# Patient Record
Sex: MALE | Race: OTHER | Employment: FULL TIME | URBAN - METROPOLITAN AREA
[De-identification: names, ages, dates, MRNs, and addresses within clinical notes are randomized per-mention and may not be internally consistent; named-entity substitution may affect disease eponyms.]

---

## 2018-08-07 ENCOUNTER — HOSPITAL ENCOUNTER (OUTPATIENT)
Dept: PHYSICAL THERAPY | Age: 53
Discharge: HOME | End: 2018-08-07
Payer: COMMERCIAL

## 2018-08-07 PROCEDURE — 97161 PT EVAL LOW COMPLEX 20 MIN: CPT

## 2018-08-07 PROCEDURE — 97140 MANUAL THERAPY 1/> REGIONS: CPT

## 2018-08-07 NOTE — PROGRESS NOTES
PHYSICAL THERAPY - DAILY TREATMENT NOTE    Patient Name: Faustino Mir        Date: 2018  : 1965   YES Patient  Verified  Visit #:     Insurance: Payor: Shawanda Berger / Plan: [place] [employer] P[place] / Product Type: PPO /      In time: 11:15 A Out time: 12 P   Total Treatment Time: 45     Medicare Time Tracking (below)   Total Timed Codes (min):  NA 1:1 Treatment Time:  NA     TREATMENT AREA =  Left shoulder pain [M25.512]    SUBJECTIVE  Pain Level (on 0 to 10 scale):  3  / 10   Medication Changes/New allergies or changes in medical history, any new surgeries or procedures?     NO    If yes, update Summary List   Subjective Functional Status/Changes:  []  No changes reported     See POC          OBJECTIVE  Modalities Rationale:    PD   min [] Estim, type/location:                                      []  att     []  unatt     []  w/US     []  w/ice    []  w/heat    min []  Mechanical Traction: type/lbs                   []  pro   []  sup   []  int   []  cont    []  before manual    []  after manual    min []  Ultrasound, settings/location:      min []  Iontophoresis w/ dexamethasone, location:                                               []  take home patch       []  in clinic    min []  Ice     []  Heat    location/position:     min []  Vasopneumatic Device, press/temp:     min []  Other:    [] Skin assessment post-treatment (if applicable):    []  intact    []  redness- no adverse reaction     []redness  adverse reaction:        5  NB min Therapeutic Exercise:  [x]  See flow sheet   Rationale:      increase ROM to improve the patients ability to return to overhead reaching     10 min Manual Therapy: Gentle manual stretch for [email or web address] deg of ABD & scaption in supine   Rationale:      decrease pain and increase ROM to improve patient's ability to return to indep reaching      min Therapeutic Activity:    Rationale:      min Neuromuscular Re-ed:    Rationale:        min Gait Training:    Rationale:       min Patient Education:  YES  Reviewed HEP   []  Progressed/Changed HEP based on: Other Objective/Functional Measures:    See POC     Post Treatment Pain Level (on 0 to 10) scale:   3  / 10     ASSESSMENT  Assessment/Changes in Function:   See POC     []  See Progress Note/Recertification   Patient will continue to benefit from skilled PT services to modify and progress therapeutic interventions, address functional mobility deficits, address ROM deficits, address strength deficits, assess and modify postural abnormalities and instruct in home and community integration to attain remaining goals.    Progress toward goals / Updated goals:    Progressing towards goals established at [address] [facility] [address]  [x]  Upgrade activities as tolerated YES Continue plan of care   []  Discharge due to :    []  Other:      Therapist: Giselle Jerome PT    Date: 8/7/2018 Time: 1:51 PM     Future Appointments  Date Time Provider [place]   8/10/2018 1:00 PM Giselle Jerome PT [facility] [place]   [facility]   8/17/2018 9:30 AM Giselle Jerome PT [facility]   8/20/2018 5:00 PM [facility]   8/24/2018 9:30 AM Giselle Jerome PT [facility]   8/27/2018 5:00 PM [facility]   8/31/2018 9:30 AM [facility]

## 2018-08-07 NOTE — PROGRESS NOTES
[address]  [facility]  [facility]  [address], [address] ,[address], [address]  Phone: [phone]  Fax: [phone] / [address] [facility]  Patient Name: Rajesh Powers : 1965   Medical   Diagnosis: Left shoulder pain [M25.512] Treatment Diagnosis: L shoulder pain   Onset Date: 5-6 mos prior to eval     Referral Source: Maxx Orr MD Start of Care [facility]): 2018   Prior Hospitalization: See medical history Provider #: [id number]   Prior Level of Function: Pain-free unlimited use of L shoulder with ADLs & work   Comorbidities: None   Medications: Verified on Patient Summary List   The Plan of Care and following information is based on the information from the initial evaluation.   ==================================================================================  Assessment / key information:  Patient is a 50 y.o. male who presents to [facility] with Dx of L shoulder pain/adhesive capsulitis. Patient reports initial onset of sx on  which were of unknown etiology. Patient reports sx are fairly constant in nature with worsening of sx with use of L shoulder such as overhead mobility, laying on L side at night, reaching behind his back, and generalized overuse of L shoulder with ADLs and work activities. Sx improve with rest, use of OTC ibuprofen prn & since most recent injection on 18. Average reported pain level at 6/10, 7/10 at worst & 4/10 at best. Upon objective evaluation patient demonstrates flex/scaption: 125 deg, ABD 92 deg, ER@ 65: 45, IR at fxl level of distal glut. Post & inferior capsule limited by tightness today, good tolerance to manual therapy & instruction in initial HEP. Overall L shoulder strength at 4/5 with mild c/o pain upon MMT throughout.  Patient can benefit from PT interventions to improve ROM & capsular mobility, decrease pain & improve strength to facilitate ADLs & overall functional status.   ==================================================================================  Eval Complexity: History LOW Complexity : Zero comorbidities / personal factors that will impact the outcome / POC;  Examination  MEDIUM Complexity : 3 Standardized tests and measures addressing body structure, function, activity limitation and / or participation in recreation ; Presentation LOW Complexity : Stable, uncomplicated ;  Decision Making MEDIUM Complexity : FOTO score of 26-74; Overall Complexity LOW   Problem List: pain affecting function, decrease ROM, decrease strength, impaired gait/ balance, decrease ADL/ functional abilitiies, decrease activity tolerance, decrease flexibility/ joint mobility and decrease transfer abilities   Treatment Plan may include any combination of the following: Therapeutic exercise, Therapeutic activities, Neuromuscular re-education, Physical agent/modality, Manual therapy, Aquatic therapy, Patient education, Self Care training, Functional mobility training and Home safety training  Patient / Family readiness to learn indicated by: asking questions, trying to perform skills and interest  Persons(s) to be included in education: patient (P)  Barriers to Learning/Limitations: None  Measures taken:    Patient Goal (s): \"ROM, no more pain, stronger shoulder\"   Patient self reported health status: fair  Rehabilitation Potential: good   Short Term Goals: To be accomplished in  2  weeks:  1) Establish HEP to prevent further disability. 2) Patient will report decreased c/o pain to < or = 3-4/10 to facilitate overhead reaching with manageable sx in L shoulder. 3) Improve FOTO score from 53 points to > or = 63 points indicating improved tolerance with ADLs in regards to L shoulder.  Long Term Goals:  To be accomplished in  4  weeks:  1) Improve FOTO score from 63 points to > or = 72 points indicating improved tolerance with ADLs in regards to L shoulder. 2) Improve L shoulder AROM for all planes of motion to [facility] so ROM is available for dressing activities and/or overhead reaching. 3) Improve overall strength of L shoulder to 5-/5 so strength is available for return to light lifting activities at work/home. 4) Patient will report decreased c/o pain to < or = 1-2/10 to facilitate sleeping at night uninterrupted with manageable sx in L shoulder. Frequency / Duration:   Patient to be seen  2-3  times per week for 4  weeks:  Patient / Caregiver education and instruction: self care, activity modification, brace/ splint application and exercises  G-Codes (GP): NA  Therapist Signature: Odella Apley, MPT, cert MDT Date: 3/8/8039   Certification Period: NA Time: 11:22 AM   ===========================================================================================  I certify that the above Physical Therapy Services are being furnished while the patient is under my care. I agree with the treatment plan and certify that this therapy is necessary. Physician Signature:        Date:       Time:     Please sign and return to [facility] [place] or you may fax the signed copy to [phone]. Thank you.

## 2018-08-10 ENCOUNTER — HOSPITAL ENCOUNTER (OUTPATIENT)
Dept: PHYSICAL THERAPY | Age: 53
Discharge: HOME | End: 2018-08-10
Payer: COMMERCIAL

## 2018-08-10 PROCEDURE — 97140 MANUAL THERAPY 1/> REGIONS: CPT

## 2018-08-10 PROCEDURE — 97110 THERAPEUTIC EXERCISES: CPT

## 2018-08-10 NOTE — PROGRESS NOTES
PHYSICAL THERAPY - DAILY TREATMENT NOTE    Patient Name: Los Bradshaw        Date: 8/10/2018  : 1965   YES Patient  Verified  Visit #:   2   of   12  Insurance: Payor: Anuradha Weir / Plan: [place] [employer] PPO / Product Type: PPO /      In time: 1:10 P Out time: 1:50 P   Total Treatment Time: 45     Medicare Time Tracking (below)   Total Timed Codes (min):  NA 1:1 Treatment Time:  NA     TREATMENT AREA =  Left shoulder pain [M25.512]    SUBJECTIVE  Pain Level (on 0 to 10 scale):  3  / 10   Medication Changes/New allergies or changes in medical history, any new surgeries or procedures? NO    If yes, update Summary List   Subjective Functional Status/Changes:  []  No changes reported     Patient reports that he is doing stretches at home & icing & he feels like he move his arm more.            OBJECTIVE  Modalities Rationale:     decrease pain to improve patient's ability to return to pain-free use of L shoulder with ADLs   min [] Estim, type/location:                                      []  att     []  unatt     []  w/US     []  w/ice    []  w/heat    min []  Mechanical Traction: type/lbs                   []  pro   []  sup   []  int   []  cont    []  before manual    []  after manual    min []  Ultrasound, settings/location:      min []  Iontophoresis w/ dexamethasone, location:                                               []  take home patch       []  in clinic   5 min [x]  Ice     []  Heat    location/position: Supine to L shoulder     min []  Vasopneumatic Device, press/temp:     min []  Other:    [] Skin assessment post-treatment (if applicable):    []  intact    []  redness- no adverse reaction     []redness  adverse reaction:        25 min Therapeutic Exercise:  [x]  See flow sheet   Rationale:      increase ROM and increase strength to improve the patients ability to return to indep dressing     15 min Manual Therapy: Gentle manual stretch for flex/scaption, ER in supine, post capsule stretch in supine Rationale:      increase ROM to improve patient's ability to return to pain-free use of L shoulder with work     min Therapeutic Activity:    Rationale:      min Neuromuscular Re-ed:    Rationale:        min Gait Training:    Rationale:       min Patient Education:  Meagan Del Valle   []  Progressed/Changed HEP based on: Other Objective/Functional Measures: Added TE per flow sheet     Post Treatment Pain Level (on 0 to 10) scale:   0  / 10     ASSESSMENT  Assessment/Changes in Function:   Good tolerance to initial program today & MT     []  See Progress Note/Recertification   Patient will continue to benefit from skilled PT services to modify and progress therapeutic interventions, address functional mobility deficits, address ROM deficits, address strength deficits, assess and modify postural abnormalities and instruct in home and community integration to attain remaining goals.    Progress toward goals / Updated goals:    Progressing towards goal established at [address]  [x]  Upgrade activities as tolerated YES Continue plan of care   []  Discharge due to :    []  Other:      Therapist: Wendy Lopez, PT    Date: 8/10/2018 Time: 3:49 PM     Future Appointments  Date Time Provider [place] [place]   8/13/2018 3:00 PM [facility]   8/17/2018 9:30 AM Wendy Patch, PT [facility]   8/20/2018 5:00 PM [facility]   8/24/2018 9:30 AM Wendy Patch, PT [facility]   8/27/2018 5:00 PM [facility]   [facility]

## 2018-08-13 ENCOUNTER — HOSPITAL ENCOUNTER (OUTPATIENT)
Dept: PHYSICAL THERAPY | Age: 53
Discharge: HOME | End: 2018-08-13
Payer: COMMERCIAL

## 2018-08-13 PROCEDURE — 97110 THERAPEUTIC EXERCISES: CPT

## 2018-08-13 PROCEDURE — 97140 MANUAL THERAPY 1/> REGIONS: CPT

## 2018-08-13 NOTE — PROGRESS NOTES
PHYSICAL THERAPY - DAILY TREATMENT NOTE    Patient Name: Darinel Garcia        Date: 2018  : 1965   yes Patient  Verified  Visit #:   3   of   12  Insurance: Payor: Rody Briones / Plan: Diego Miller PPO / Product Type: PPO /      In time: 3:00 Out time: 4:07   Total Treatment Time: 67     Medicare/BCBS Time Tracking (below)   Total Timed Codes (min):  NA 1:1 Treatment Time:  NA     TREATMENT AREA =  Left shoulder pain [M25.512]    SUBJECTIVE  Pain Level (on 0 to 10 scale):  0  / 10   Medication Changes/New allergies or changes in medical history, any new surgeries or procedures?    no  If yes, update Summary List   Subjective Functional Status/Changes:  []  No changes reported     Patient reports finally getting a full night of sleep, however continues to avoid sleeping on L shoulder.  States that he was showing off reaching OH at work prior to today's PT session; denies sharp pain during activities       OBJECTIVE  Modalities Rationale:     decrease pain to improve patient's ability to return to pain-free functional ADLs   min [] Estim, type/location:                                      []  att     []  unatt     []  w/US     []  w/ice    []  w/heat    min []  Mechanical Traction: type/lbs                   []  pro   []  sup   []  int   []  cont    []  before manual    []  after manual    min []  Ultrasound, settings/location:      min []  Iontophoresis w/ dexamethasone, location:                                               []  take home patch       []  in clinic   10 min [x]  Ice     []  Heat    location/position: Supine with wedge to L shoulder post-session    min []  Vasopneumatic Device, press/temp:     min []  Other:    [] Skin assessment post-treatment (if applicable):    []  intact    []  redness- no adverse reaction     []redness  adverse reaction:        47 min Therapeutic Exercise:  [x]  See flow sheet   Rationale:      increase ROM and increase strength to improve the patients ability to return to independent dressing. 10 min Manual Therapy: Gentle L shoulder manual stretch for flexion/scaption, ER (at 45 deg abd), and post capsule stretch in supine   Rationale:      increase ROM to improve patient's ability to perform pain-free work activities     min Therapeutic Activity:    Rationale:        min Neuromuscular Re-ed:    Rationale:       min Gait Training:    Rationale:         min Patient Education:  yes  Reviewed HEP   []  Progressed/Changed HEP based on: Other Objective/Functional Measures:    Presented with tightness and tenderness in post capsule during ER and post cap stretch; denies sharp pain during MT    Added sleeper stretch and S/L abd to 90 deg     Post Treatment Pain Level (on 0 to 10) scale:   0  / 10     ASSESSMENT  Assessment/Changes in Function:     Demonstrated good tolerance with progression of exercises; noted \"pressure\" during sleeper stretch and ms fatigue during S/L abd to 90, however denies sharp pain or red flags. []  See Progress Note/Recertification   Patient will continue to benefit from skilled PT services to modify and progress therapeutic interventions, address functional mobility deficits, address ROM deficits, address strength deficits, analyze and address soft tissue restrictions, analyze and cue movement patterns, analyze and modify body mechanics/ergonomics and assess and modify postural abnormalities to attain remaining goals.    Progress toward goals / Updated goals:    Good progress towards STG #1 and #2     PLAN  []  Upgrade activities as tolerated yes Continue plan of care   []  Discharge due to :    []  Other:      Therapist: LUZ Maciel    Date: 8/13/2018 Time: 4:17 PM     Future Appointments  Date Time Provider [place] [place]   8/13/2018 3:00 PM Ayla [facility]   8/17/2018 9:30 AM Jose Daniel Blanton, PT [facility]   8/20/2018 5:00 PM Ayla [facility]   8/24/2018 9:30 AM Chavez Donald, PT [facility] 8/27/2018 5:00 PM Briseyda Ornelas [facility]   8/31/2018 9:30 AM Briseyda Ornelas [facility]

## 2018-08-17 ENCOUNTER — HOSPITAL ENCOUNTER (OUTPATIENT)
Dept: PHYSICAL THERAPY | Age: 53
Discharge: HOME | End: 2018-08-17
Payer: COMMERCIAL

## 2018-08-17 PROCEDURE — 97140 MANUAL THERAPY 1/> REGIONS: CPT

## 2018-08-17 PROCEDURE — 97110 THERAPEUTIC EXERCISES: CPT

## 2018-08-17 NOTE — PROGRESS NOTES
PHYSICAL THERAPY - DAILY TREATMENT NOTE    Patient Name: Donnie Troy        Date: 2018  : 1965   YES Patient  Verified  Visit #:   4   of   12  Insurance: Payor: Angel Mckeon / Plan: [place] [employer] PPO / Product Type: PPO /      In time: 9:25 A Out time: 10:30 A   Total Treatment Time: 55/  40     Medicare Time Tracking (below)   Total Timed Codes (min):  NA 1:1 Treatment Time:  NA     TREATMENT AREA =  Left shoulder pain [M25.512]    SUBJECTIVE  Pain Level (on 0 to 10 scale):  0  / 10   Medication Changes/New allergies or changes in medical history, any new surgeries or procedures? NO    If yes, update Summary List   Subjective Functional Status/Changes:  []  No changes reported     Patient reports that he will try to play golf next week, he has not played golf in over a month.            OBJECTIVE  Modalities Rationale:     decrease pain to improve patient's ability to return to pain-free standing   min [] Estim, type/location:                                      []  att     []  unatt     []  w/US     []  w/ice    []  w/heat    min []  Mechanical Traction: type/lbs                   []  pro   []  sup   []  int   []  cont    []  before manual    []  after manual    min []  Ultrasound, settings/location:      min []  Iontophoresis w/ dexamethasone, location:                                               []  take home patch       []  in clinic   10 min [x]  Ice     []  Heat    location/position: Supine to L shoulder    min []  Vasopneumatic Device, press/temp:     min []  Other:    [] Skin assessment post-treatment (if applicable):    []  intact    []  redness- no adverse reaction     []redness  adverse reaction:        35/  30 min Therapeutic Exercise:  [x]  See flow sheet   Rationale:      increase ROM and increase strength to improve the patients ability to return to pain-free use of L shoulder with golf     10 min Manual Therapy: Gentle manual stretch for flexion, scaption, [email or web address] in supine Rationale:      decrease pain and increase ROM to improve patient's ability to return to indep dressing     min Therapeutic Activity:    Rationale:      min Neuromuscular Re-ed:    Rationale:        min Gait Training:    Rationale:       min Patient Education:  YES  Reviewed HEP   []  Progressed/Changed HEP based on: Other Objective/Functional Measures:    PROM: [email or web address]: 60 deg in supine     Post Treatment Pain Level (on 0 to 10) scale:   4  / 10     ASSESSMENT  Assessment/Changes in Function:   Slight increase in pain after capsular stretches today      []  See Progress Note/Recertification   Patient will continue to benefit from skilled PT services to modify and progress therapeutic interventions, address functional mobility deficits, address ROM deficits, address strength deficits, assess and modify postural abnormalities and instruct in home and community integration to attain remaining goals.    Progress toward goals / Updated goals:    Progressing towards STG 2, 3     PLAN  [x]  Upgrade activities as tolerated YES Continue plan of care   []  Discharge due to :    []  Other:      Therapist: Oralia Swenson PT    Date: 8/17/2018 Time: 10:06 AM     Future Appointments  Date Time Provider [place]   8/20/2018 5:00 PM [facility] [place] [place]   8/24/2018 9:30 AM Oralia Swenson [place] [facility] [place] [facility] [place]   8/27/2018 5:00 PM [facility]   8/31/2018 9:30 AM [facility]

## 2018-08-20 ENCOUNTER — HOSPITAL ENCOUNTER (OUTPATIENT)
Dept: PHYSICAL THERAPY | Age: 53
Discharge: HOME | End: 2018-08-20
Payer: COMMERCIAL

## 2018-08-20 PROCEDURE — 97110 THERAPEUTIC EXERCISES: CPT

## 2018-08-20 PROCEDURE — 97140 MANUAL THERAPY 1/> REGIONS: CPT

## 2018-08-20 NOTE — PROGRESS NOTES
PHYSICAL THERAPY - DAILY TREATMENT NOTE    Patient Name: Mary Jane Theodore        Date: 2018  : 1965   yes Patient  Verified  Visit #:      of   12  Insurance: Payor: Tracie Mesa / Plan: [address] PPO / Product Type: PPO /      In time: 4:55 Out time: 6:06   Total Treatment Time: 71     Medicare/[facility] Time Tracking (below)   Total Timed Codes (min):  NA 1:1 Treatment Time:  NA     TREATMENT AREA =  Left shoulder pain [M25.512]    SUBJECTIVE  Pain Level (on 0 to 10 scale):  0  / 10   Medication Changes/New allergies or changes in medical history, any new surgeries or procedures?    no  If yes, update Summary List   Subjective Functional Status/Changes:  []  No changes reported     Patient reports going to a party over the weekend, staying up late on Saturday night, and his back started bothering him. States that he was throwing/catching a baseball prior to today's PT session and noted superior to posterior L shoulder pain when trying to catch an [place] ball.  Reports he was unable to go golfing this morning to check his tolerance       OBJECTIVE  Modalities Rationale:     decrease pain to improve patient's ability to return to pain-free functional ADLs   min [] Estim, type/location:                                      []  att     []  unatt     []  w/US     []  w/ice    []  w/heat    min []  Mechanical Traction: type/lbs                   []  pro   []  sup   []  int   []  cont    []  before manual    []  after manual    min []  Ultrasound, settings/location:      min []  Iontophoresis w/ dexamethasone, location:                                               []  take home patch       []  in clinic   10 min [x]  Ice     []  Heat    location/position: Supine with wedge to L shoulder post-session    min []  Vasopneumatic Device, press/temp:     min []  Other:    [] Skin assessment post-treatment (if applicable):    []  intact    []  redness- no adverse reaction     []redness  adverse reaction:        41/51 min Therapeutic Exercise:  [x]  See flow sheet   Rationale:      increase ROM and increase strength to improve the patients ability to return to independent dressing. 10 min Manual Therapy: Gentle L shoulder manual stretch for flexion/scaption, ER/IR (at 45 deg abd)   Rationale:      increase ROM to improve patient's ability to perform pain-free work activities     min Therapeutic Activity:    Rationale:        min Neuromuscular Re-ed:    Rationale:       min Gait Training:    Rationale:         min Patient Education:  yes  Reviewed HEP   [x]  Progressed/Changed HEP based on: Added sleeper stretch     Other Objective/Functional Measures:    TE per flowsheet    ERP/discomfort in L post cap with PROM flexion/scaption >110 deg and ER >60 deg    Added finger ladder scaption   Post Treatment Pain Level (on 0 to 10) scale:   0  / 10     ASSESSMENT  Assessment/Changes in Function:     Noted discomfort/pain with dowel ER and sleeper stretch in L post cap; decrease c/o discomfort/pain with rest and following ice     []  See Progress Note/Recertification   Patient will continue to benefit from skilled PT services to modify and progress therapeutic interventions, address functional mobility deficits, address ROM deficits, address strength deficits, analyze and address soft tissue restrictions, analyze and cue movement patterns, analyze and modify body mechanics/ergonomics and assess and modify postural abnormalities to attain remaining goals.    Progress toward goals / Updated goals:    Slow progress towards STG #2  Will continue to progress therex per patient's tolerance       PLAN  []  Upgrade activities as tolerated yes Continue plan of care   []  Discharge due to :    []  Other:      Therapist: LUZ Allison    Date: 8/20/2018 Time: 6:31 PM     Future Appointments  Date Time Provider [place]   8/20/2018 5:00 PM Corrina Cantu [facility]   8/24/2018 9:30 AM Sebastian Shannon PT [facility]   8/27/2018 5:00 PM [facility]ie Fr[facility] [address]   [facility]2018 [facility] [address]

## 2018-08-24 ENCOUNTER — HOSPITAL ENCOUNTER (OUTPATIENT)
Dept: PHYSICAL THERAPY | Age: 53
Discharge: HOME | End: 2018-08-24
Payer: COMMERCIAL

## 2018-08-24 PROCEDURE — 97140 MANUAL THERAPY 1/> REGIONS: CPT

## 2018-08-24 PROCEDURE — 97110 THERAPEUTIC EXERCISES: CPT

## 2018-08-24 NOTE — PROGRESS NOTES
PHYSICAL THERAPY - DAILY TREATMENT NOTE    Patient Name: Amelia Aly        Date: 2018  : 1965   yes Patient  Verified  Visit #:      12  Insurance: Payor: Selin Lamb / Plan: Starla Hemphill PPO / Product Type: PPO /      In time: 9:26 AM Out time: 10:35 AM   Total Treatment Time: 69     Medicare/BCBS Time Tracking (below)   Total Timed Codes (min):  NA 1:1 Treatment Time:  NA     TREATMENT AREA =  Left shoulder pain [M25.512]    SUBJECTIVE  Pain Level (on 0 to 10 scale):  0  / 10   Medication Changes/New allergies or changes in medical history, any new surgeries or procedures?    no  If yes, update Summary List   Subjective Functional Status/Changes:  []  No changes reported     \"I haven't tried golfing yet, but I am on the . I've been up since 330 AM today so I'm a little tired, the shoulder is good though. It's just a little tight in the front and some in the back. \"       OBJECTIVE  Modalities Rationale:     decrease pain to improve patient's ability to return to pain-free functional ADLs   min [] Estim, type/location:                                      []  att     []  unatt     []  w/US     []  w/ice    []  w/heat    min []  Mechanical Traction: type/lbs                   []  pro   []  sup   []  int   []  cont    []  before manual    []  after manual    min []  Ultrasound, settings/location:      min []  Iontophoresis w/ dexamethasone, location:                                               []  take home patch       []  in clinic   10 min [x]  Ice     []  Heat    location/position: Supine with wedge to L shoulder post-session    min []  Vasopneumatic Device, press/temp:     min []  Other:    [] Skin assessment post-treatment (if applicable):    []  intact    []  redness- no adverse reaction     []redness  adverse reaction:        44 min Therapeutic Exercise:  [x]  See flow sheet   Rationale:      increase ROM and increase strength to improve the patients ability to return to independent dressing. 15 min Manual Therapy: Gentle L shoulder manual stretch for flexion/scaption, ER/IR (at 45 deg abd); contract/relax ER/IR; STM to anterior delt   Rationale:      increase ROM to improve patient's ability to perform pain-free work activities     min Therapeutic Activity:    Rationale:        min Neuromuscular Re-ed:    Rationale:       min Gait Training:    Rationale:         min Patient Education:  yes  Reviewed HEP   []  Progressed/Changed HEP based on: Other Objective/Functional Measures:    TE per flowsheet    L shoulder AROM: flex 145 deg, abd 125 deg, funct ER C7, and funct IR L5; noted end range discomfort with all movements    Added corner stretch and IR with towel   Post Treatment Pain Level (on 0 to 10) scale:   0  / 10     ASSESSMENT  Assessment/Changes in Function:     Noted L anterior shoulder discomfort during new stretches, however good relief of initial c/o tightness following stretch. []  See Progress Note/Recertification   Patient will continue to benefit from skilled PT services to modify and progress therapeutic interventions, address functional mobility deficits, address ROM deficits, address strength deficits, analyze and address soft tissue restrictions, analyze and cue movement patterns, analyze and modify body mechanics/ergonomics and assess and modify postural abnormalities to attain remaining goals.    Progress toward goals / Updated goals:    Progressing towards LTG #2  Will continue to progress therex per patient's tolerance       PLAN  []  Upgrade activities as tolerated yes Continue plan of care   []  Discharge due to :    []  Other:      Therapist: LUZ Jensen    Date: 8/24/2018 Time: 12:54 PM     Future Appointments  Date Time Provider Dylon Madison   8/24/2018 9:30 AM Mayra Bertrand [facility]   8/27/2018 5:00 PM Mayra Bertrand [facility]   8/31/2018 9:30 AM Mayra Bertrand [facility]

## 2018-08-27 ENCOUNTER — HOSPITAL ENCOUNTER (OUTPATIENT)
Dept: PHYSICAL THERAPY | Age: 53
Discharge: HOME | End: 2018-08-27
Payer: COMMERCIAL

## 2018-08-27 PROCEDURE — 97140 MANUAL THERAPY 1/> REGIONS: CPT

## 2018-08-27 PROCEDURE — 97110 THERAPEUTIC EXERCISES: CPT

## 2018-08-27 NOTE — PROGRESS NOTES
PHYSICAL THERAPY - DAILY TREATMENT NOTE    Patient Name: Adriana Norman        Date: 2018  : 1965   yes Patient  Verified  Visit #:     Insurance: Payor: Kaylah Valencia / Plan: Airam Valentin PPO / Product Type: PPO /      In time: 5:00 Out time: 6:09   Total Treatment Time: 69     Medicare/BCBS Time Tracking (below)   Total Timed Codes (min):  NA 1:1 Treatment Time:  NA     TREATMENT AREA =  Left shoulder pain [M25.512]    SUBJECTIVE  Pain Level (on 0 to 10 scale):  3  / 10   Medication Changes/New allergies or changes in medical history, any new surgeries or procedures?    no  If yes, update Summary List   Subjective Functional Status/Changes:  []  No changes reported     \"I was carrying a 5 gallon bucket filled with baseballs with my L arm. It really hurts on the L side near my shoulder blade.  I haven't gone golfing yet, but I will be golfing on Thursday for my birthday\"       OBJECTIVE  Modalities Rationale:     decrease pain to improve patient's ability to return to pain-free functional ADLs   min [] Estim, type/location:                                      []  att     []  unatt     []  w/US     []  w/ice    []  w/heat    min []  Mechanical Traction: type/lbs                   []  pro   []  sup   []  int   []  cont    []  before manual    []  after manual    min []  Ultrasound, settings/location:      min []  Iontophoresis w/ dexamethasone, location:                                               []  take home patch       []  in clinic   5NB min [x]  Ice     []  Heat    location/position: Supine with wedge to L shoulder post-session    min []  Vasopneumatic Device, press/temp:     min []  Other:    [] Skin assessment post-treatment (if applicable):    []  intact    []  redness- no adverse reaction     []redness  adverse reaction:        49 min Therapeutic Exercise:  [x]  See flow sheet   Rationale:      increase ROM and increase strength to improve the patients ability to return to independent dressing. 15 min Manual Therapy: Gentle L shoulder manual stretch for flexion/scaption, ER/IR (at 45 deg abd); contract/relax ER/IR; STM/DTM to infraspinatus and teres minor   Rationale:      increase ROM to improve patient's ability to perform pain-free work activities     min Therapeutic Activity:    Rationale:        min Neuromuscular Re-ed:    Rationale:       min Gait Training:    Rationale:         min Patient Education:  yes  Reviewed HEP   []  Progressed/Changed HEP based on: Other Objective/Functional Measures:    R AROM IR: T7  L AROM IR: L5   L AAROM IR: L1    Demonstrates full, end-range discomfort active flexion, abd, funct ER; continues to demonstrate ROM restriction with IR with end-range discomfort    Added S/L ER to neutral  Added weight to S/L abd to 90 deg     Post Treatment Pain Level (on 0 to 10) scale:   0  / 10     ASSESSMENT  Assessment/Changes in Function:     Demonstrated improved L AROM IR following IR stretch with towel, however noted end range discomfort. Demonstrated good tolerance with progression of strengthening exercises; denies sharp pain or red flags during exercises. Noted good pain relief following stretches and utilization of ice to L shoulder. Discussed and educated patient on performing stretches before and after recreational activities to decrease c/o soreness and pain in L shoulder/shoulder blade area. Patient responded with good understanding. []  See Progress Note/Recertification   Patient will continue to benefit from skilled PT services to modify and progress therapeutic interventions, address functional mobility deficits, address ROM deficits, address strength deficits, analyze and address soft tissue restrictions, analyze and cue movement patterns, analyze and modify body mechanics/ergonomics and assess and modify postural abnormalities to attain remaining goals.    Progress toward goals / Updated goals:    Good progress towards ROM and strengthening goals  Will continue to progress therex per patient's tolerance        PLAN  []  Upgrade activities as tolerated yes Continue plan of care   []  Discharge due to :    []  Other:      Therapist: LUZ Chery    Date: 8/27/2018 Time: 6:15 PM     Future Appointments  Date Time Provider Dylon Madison   8/27/2018 5:00 PM Jairo Crocker [facility]   8/31/2018 9:30 AM Jairo Crocker [facility]

## 2018-08-31 ENCOUNTER — HOSPITAL ENCOUNTER (OUTPATIENT)
Dept: PHYSICAL THERAPY | Age: 53
Discharge: HOME | End: 2018-08-31
Payer: COMMERCIAL

## 2018-08-31 PROCEDURE — 97110 THERAPEUTIC EXERCISES: CPT

## 2018-08-31 PROCEDURE — 97140 MANUAL THERAPY 1/> REGIONS: CPT

## 2018-08-31 NOTE — PROGRESS NOTES
PHYSICAL THERAPY - DAILY TREATMENT NOTE Patient Name: Wally Rahman        Date: 2018 : 1965   yes Patient  Verified Visit #:     Insurance: Payor: Enid Perea / Plan: [employer] PPO / Product Type: PPO / In time: 9:35 AM Out time: 10:46 AM  
Total Treatment Time: 70 Medicare/BCBS Time Tracking (below) Total Timed Codes (min):  NA 1:1 Treatment Time:  NA  
 
TREATMENT AREA =  Left shoulder pain [M25.512] SUBJECTIVE Pain Level (on 0 to 10 scale):  0  / 10 Medication Changes/New allergies or changes in medical history, any new surgeries or procedures?    no  If yes, update Summary List  
Subjective Functional Status/Changes:  []  No changes reported Patient reports hitting 2 buckets full of golf balls yesterday; denies sharp pain or red flags during activity. Reports he has a MD F/U next Thurs () OBJECTIVE Modalities Rationale:     decrease pain to improve patient's ability to return to pain-free functional ADLs 
 min [] Estim, type/location:    
                                 []  att     []  unatt     []  w/US     []  w/ice    []  w/heat  
 min []  Mechanical Traction: type/lbs   
               []  pro   []  sup   []  int   []  cont    []  before manual    []  after manual  
 min []  Ultrasound, settings/location:    
 min []  Iontophoresis w/ dexamethasone, location:   
                                           []  take home patch       []  in clinic  
10 min [x]  Ice     []  Heat    location/position: Supine with wedge to L shoulder post-session  
 min []  Vasopneumatic Device, press/temp:   
 min []  Other:   
[] Skin assessment post-treatment (if applicable):   
[]  intact    []  redness- no adverse reaction    
[]redness  adverse reaction:     
 
51 min Therapeutic Exercise:  [x]  See flow sheet Rationale:      increase ROM and increase strength to improve the patients ability to return to independent dressing. 10 min Manual Therapy: Gentle L shoulder manual stretch for ER/IR (at 45 deg abd); contract/relax ER/IR; STM/DTM to infraspinatus, teres minor, and post delt Rationale:      increase ROM to improve patient's ability to perform pain-free work activities 
 
 min Therapeutic Activity:   
Rationale:    
 
 min Neuromuscular Re-ed:   
Rationale:   
 
 min Gait Training:   
Rationale:     
 
 min Patient Education:  yes  Reviewed HEP []  Progressed/Changed HEP based on: Other Objective/Functional Measures: 
 
TE per flowsheet Added standing AROM flexion and scaption Post Treatment Pain Level (on 0 to 10) scale:   0  / 10 ASSESSMENT Assessment/Changes in Function:  
 
Req'd cues to avoid UT elevation during flex/scaption AROM. Able to demonstrate good carryover with cues. Continues to have end range discomfort following IR stretch, however continues to demonstrate improve active IR following stretch 
  
[]  See Progress Note/Recertification Patient will continue to benefit from skilled PT services to modify and progress therapeutic interventions, address functional mobility deficits, address ROM deficits, address strength deficits, analyze and address soft tissue restrictions, analyze and cue movement patterns, analyze and modify body mechanics/ergonomics and assess and modify postural abnormalities to attain remaining goals. Progress toward goals / Updated goals: 
 
PN NV 
Good progress towards ROM goals PLAN 
[]  Upgrade activities as tolerated yes Continue plan of care  
[]  Discharge due to :   
[]  Other:   
 
Therapist: LUZ Escalante Date: 8/31/2018 Time: 12:20 PM  
 
Future Appointments Date Time Provider Dylon Madison 8/31/2018 9:30 AM Mamadou Prince [facility]

## 2018-09-04 ENCOUNTER — HOSPITAL ENCOUNTER (OUTPATIENT)
Dept: PHYSICAL THERAPY | Age: 53
Discharge: HOME | End: 2018-09-04
Payer: COMMERCIAL

## 2018-09-04 PROCEDURE — 97110 THERAPEUTIC EXERCISES: CPT

## 2018-09-04 NOTE — PROGRESS NOTES
[address]  [facility]  [facility] 
[facility] [address], [address] ,[address], [address]  Phone: [phone]  Fax: [phone] PROGRESS NOTE Patient Name: Rajesh Powers : 1965 Treatment/Medical Diagnosis: Left shoulder pain [M25.512] Referral Source: Maxx Orr MD    
Date of Initial Visit: 18 Attended Visits: 9 Missed Visits: 0  
 
SUMMARY OF TREATMENT Therapeutic exercise including ROM, stretching, gentle strengthening, scapular stabilization training, postural ed, patient education, HEP instruction, CP, manual therapy. CURRENT STATUS Mr. Case Nicole has made good progress with PT & reports sx in L shoulder are now intermittent in nature, pain level at worst 3/10. He reports increased c/o pain with reaching behind his back & with reaching across his body, although this continues to improve. L shoulder AROM is now grossly WFL, [email or web address] of ABD slightly limited at 70 deg in supine & IR limited at level of L4. Goal/Measure of Progress Goal Met? 1.  Establish HEP to prevent further disability. Status at last Eval: NA Current Status: HEP established yes 2. Patient will report decreased c/o pain to < or = 3-4/10 to facilitate overhead reaching with manageable sx in L shoulder. Status at last Eval: Average 6/10 At worst 7/10 At best 4/10  Current Status: Average 3/10 At worst 3/10 At best 0/10 yes 3. Improve FOTO score from 53 points to > or = 63 points indicating improved tolerance with ADLs in regards to L shoulder. Status at last Eval: 53 Current Status: 71 yes New Goals to be achieved in __3-4__  weeks: 1. Improve FOTO score from 71 points to > or = 74 points indicating improved tolerance with ADLs in regards to L knee. 2.  Improve overall strength of L shoulder to 5/5 so strength is available for return to light lifting activities at work/home. 3.  Patient to report 75% improvement in overall function in preparation for return to recreational activities with manageable sx in L shoulder. 4.  Improve L shoulder AROM for IR to fxl level of T12 so ROM is available for dressing activities and/or overhead reaching. G-Codes (GP): NA 
RECOMMENDATIONS Patient to continue with PT for up to 2-3 more weeks prn in order to progress towards achieving all LTGs. If you have any questions/comments please contact us directly at [phone]. Thank you for allowing us to assist in the care of your patient. Therapist Signature: RADHA Lai, cert MDT Date: 3/7/1950 Time: 1:48 PM  
NOTE TO PHYSICIAN:  PLEASE COMPLETE THE ORDERS BELOW AND FAX TO [facility]: [phone]. If you are unable to process this request in 24 hours please contact our office: [phone]. 
 
___ I have read the above report and request that my patient continue as recommended.  
___ I have read the above report and request that my patient continue therapy with the following changes/special instructions:_________________________________________________________  
___ I have read the above report and request that my patient be discharged from therapy.   
 
Physician Signature:        Date:       Time:

## 2018-09-04 NOTE — PROGRESS NOTES
PHYSICAL THERAPY - DAILY TREATMENT NOTE Patient Name: Doyle Lopez        Date: 2018 : 1965   YES Patient  Verified Visit #:     Insurance: Payor: Vance Walker / Plan: [place] [employer] PPO / Product Type: PPO / In time: 1:35 P Out time: 2:40 P Total Treatment Time: 60 Medicare Time Tracking (below) Total Timed Codes (min):  NA 1:1 Treatment Time:  NA  
 
TREATMENT AREA =  Left shoulder pain [M25.512] SUBJECTIVE Pain Level (on 0 to 10 scale):  0  / 10 Medication Changes/New allergies or changes in medical history, any new surgeries or procedures? NO    If yes, update Summary List  
Subjective Functional Status/Changes:  []  No changes reported See PN to MD  
 
  
 
OBJECTIVE Modalities Rationale:     decrease pain to improve patient's ability to return to pain-free lifting  
 min [] Estim, type/location:    
                                 []  att     []  unatt     []  w/US     []  w/ice    []  w/heat  
 min []  Mechanical Traction: type/lbs   
               []  pro   []  sup   []  int   []  cont    []  before manual    []  after manual  
 min []  Ultrasound, settings/location:    
 min []  Iontophoresis w/ dexamethasone, location:   
                                           []  take home patch       []  in clinic  
10 min [x]  Ice     []  Heat    location/position: Supine to L shoulder   
 min []  Vasopneumatic Device, press/temp:   
 min []  Other:   
[] Skin assessment post-treatment (if applicable):   
[]  intact    []  redness- no adverse reaction    
[]redness  adverse reaction:     
 
50 min Therapeutic Exercise:  [x]  See flow sheet Rationale:      increase ROM and increase strength to improve the patients ability to return to light lifting using L UE  
 
 min Manual Therapy:   
Rationale:      
 
 min Therapeutic Activity:   
Rationale:     
 
 min Neuromuscular Re-ed:   
Rationale:   
 
 min Gait Training:   
Rationale: min Patient Education:  YES  Reviewed HEP []  Progressed/Changed HEP based on: Other Objective/Functional Measures: FOTO 71 
AROM, see PN for improvements Post Treatment Pain Level (on 0 to 10) scale:   0  / 10 ASSESSMENT Assessment/Changes in Function:  
 
Good improvements with fxl strength & ROM as well as FOTO score 
  
[]  See Progress Note/Recertification Patient will continue to benefit from skilled PT services to address functional mobility deficits, address ROM deficits, address strength deficits, analyze and address soft tissue restrictions, analyze and modify body mechanics/ergonomics, assess and modify postural abnormalities and instruct in home and community integration to attain remaining goals. Progress toward goals / Updated goals: 
 
Progressing towards LTGs, all STGs met PLAN [x]  Upgrade activities as tolerated YES Continue plan of care  
[]  Discharge due to :   
[x]  Other: See PN to MD  
 
Therapist: Alana Rosenbaum Date: 9/4/2018 Time: 1:47 PM  
 
No future appointments.

## 2018-09-10 ENCOUNTER — HOSPITAL ENCOUNTER (OUTPATIENT)
Dept: PHYSICAL THERAPY | Age: 53
Discharge: HOME | End: 2018-09-10
Payer: COMMERCIAL

## 2018-09-10 PROCEDURE — 97110 THERAPEUTIC EXERCISES: CPT

## 2018-09-10 NOTE — PROGRESS NOTES
PHYSICAL THERAPY - DAILY TREATMENT NOTE Patient Name: Blas Cortez        Date: 9/10/2018 : 1965   yes Patient  Verified Visit #:   10   of   12 (+6)  Insurance: Payor: Andry Lose / Plan: [employer] PPO / Product Type: PPO / In time: 4:00 Out time: 4:32 Total Treatment Time: 32 Medicare/BCBS Time Tracking (below) Total Timed Codes (min):  NA 1:1 Treatment Time:  NA  
 
TREATMENT AREA =  Left shoulder pain [M25.512] SUBJECTIVE Pain Level (on 0 to 10 scale):  0  / 10 Medication Changes/New allergies or changes in medical history, any new surgeries or procedures?    no  If yes, update Summary List  
Subjective Functional Status/Changes:  []  No changes reported \"Dr. 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 He said the hardest motions to get back will be the one reaching behind my back and behind my back, and it will take time. He said I'm good to be discharged from PT, but I think I'll come 2 more times to make sure I am good\" OBJECTIVE Modalities Rationale:     decrease pain to improve patient's ability to return to pain-free functional ADLs 
 min [] Estim, type/location:    
                                 []  att     []  unatt     []  w/US     []  w/ice    []  w/heat  
 min []  Mechanical Traction: type/lbs   
               []  pro   []  sup   []  int   []  cont    []  before manual    []  after manual  
 min []  Ultrasound, settings/location:    
 min []  Iontophoresis w/ dexamethasone, location:   
                                           []  take home patch       []  in clinic  
TC min [x]  Ice     []  Heat    location/position: Supine with wedge to L shoulder post-session  
 min []  Vasopneumatic Device, press/temp:   
 min []  Other:   
[] Skin assessment post-treatment (if applicable):   
[]  intact    []  redness- no adverse reaction    
[]redness  adverse reaction:     
 
32 min Therapeutic Exercise:  [x]  See flow sheet Rationale:      increase ROM and increase strength to improve the patients ability to return to pain-free recreational activities. min Manual Therapy:   
Rationale:       
 
 min Therapeutic Activity:   
Rationale:    
 
 min Neuromuscular Re-ed:   
Rationale:   
 
 min Gait Training:   
Rationale:     
 
 min Patient Education:  yes  Reviewed HEP []  Progressed/Changed HEP based on: Other Objective/Functional Measures: Added standing bodyblade Post Treatment Pain Level (on 0 to 10) scale:   0  / 10 ASSESSMENT Assessment/Changes in Function:  
 
Patient was on a time-constraint due to work schedule. Able to perform exercises without increase pain or red flags. []  See Progress Note/Recertification Patient will continue to benefit from skilled PT services to modify and progress therapeutic interventions, address functional mobility deficits, address ROM deficits, address strength deficits, analyze and address soft tissue restrictions, analyze and cue movement patterns, analyze and modify body mechanics/ergonomics and assess and modify postural abnormalities to attain remaining goals. Progress toward goals / Updated goals: 
 
Good progress towards LTG #2 Will continue to progress therex per patient's tolerance PLAN 
[]  Upgrade activities as tolerated yes Continue plan of care  
[]  Discharge due to :   
[]  Other:   
 
Therapist: LUZ Bunn Date: 9/10/2018 Time: 5:15 PM  
 
Future Appointments Date Time Provider Dylon Madison 9/10/2018 4:00 PM Shahla Madrigal [facility]  
9/13/2018 12:00 PM Giselle Jerome, BARNEY [facility]  
9/18/2018 2:00 PM Giselle Jerome, PT [facility] [place] [place]  
9/20/2018 3:30 PM Purnima Goodwin PT [facility] [place] [place]

## 2018-09-18 ENCOUNTER — HOSPITAL ENCOUNTER (OUTPATIENT)
Dept: PHYSICAL THERAPY | Age: 53
Discharge: HOME | End: 2018-09-18
Payer: COMMERCIAL

## 2018-09-18 PROCEDURE — 97110 THERAPEUTIC EXERCISES: CPT

## 2018-09-18 NOTE — PROGRESS NOTES
PHYSICAL THERAPY - DAILY TREATMENT NOTE Patient Name: Marifer Bo        Date: 2018 : 1965   YES Patient  Verified Visit #:     Insurance: Payor: Ronit Lorenzo / [place] / Product Type: PPO / In time: 2:00 P Out time: 3:15 P Total Treatment Time: 70 Medicare Time Tracking (below) Total Timed Codes (min):  NA 1:1 Treatment Time:  NA  
 
TREATMENT AREA =  Left shoulder pain [M25.512] SUBJECTIVE Pain Level (on 0 to 10 scale):  0  / 10 Medication Changes/New allergies or changes in medical history, any new surgeries or procedures? NO    If yes, update Summary List  
Subjective Functional Status/Changes:  []  No changes reported Patient reports he has been able to play golf, he is still eager to return to weight training but he will hold off until he feels better. OBJECTIVE Modalities Rationale:     decrease pain to improve patient's ability to return to pain-free lifting using L shoulder  
 min [] Estim, type/location:    
                                 []  att     []  unatt     []  w/US     []  w/ice    []  w/heat  
 min []  Mechanical Traction: type/lbs   
               []  pro   []  sup   []  int   []  cont    []  before manual    []  after manual  
 min []  Ultrasound, settings/location:    
 min []  Iontophoresis w/ dexamethasone, location:   
                                           []  take home patch       []  in clinic  
10 min [x]  Ice     []  Heat    location/position: Supine to L shoulder   
 min []  Vasopneumatic Device, press/temp:   
 min []  Other:   
[] Skin assessment post-treatment (if applicable):   
[]  intact    []  redness- no adverse reaction    
[]redness  adverse reaction:     
 
60 min Therapeutic Exercise:  [x]  See flow sheet Rationale:      increase ROM and increase strength to improve the patients ability to return to pain-free lifting program   
 
 min Manual Therapy:   
Rationale: min Therapeutic Activity:   
Rationale:     
 
 min Neuromuscular Re-ed:   
Rationale:   
 
 min Gait Training:   
Rationale:     
 min Patient Education:  YES  Reviewed HEP []  Progressed/Changed HEP based on: Other Objective/Functional Measures: Added L shoulder IR stretch with belt in standing, Tband ER/IR Post Treatment Pain Level (on 0 to 10) scale:   0  / 10 ASSESSMENT Assessment/Changes in Function:  
 
Good tolerance to strength progressions, issued updated home program & RTB for home  
  
[]  See Progress Note/Recertification Patient will continue to benefit from skilled PT services to modify and progress therapeutic interventions, address functional mobility deficits, address ROM deficits, address strength deficits, assess and modify postural abnormalities and instruct in home and community integration to attain remaining goals. Progress toward goals / Updated goals: 
 
Progressing towards LTG 1, 2, 4 PLAN [x]  Upgrade activities as tolerated YES Continue plan of care  
[]  Discharge due to :   
[]  Other:   
 
Therapist: Kierra Alfonso, PT Date: 9/18/2018 Time: 2:23 PM  
 
Future Appointments Date Time Provider Dylon Madison 9/20/2018 3:30 PM Purnima Marcus, PT [facility]

## 2018-09-20 ENCOUNTER — APPOINTMENT (OUTPATIENT)
Dept: PHYSICAL THERAPY | Age: 53
End: 2018-09-20
Payer: COMMERCIAL

## 2018-09-21 ENCOUNTER — HOSPITAL ENCOUNTER (OUTPATIENT)
Dept: PHYSICAL THERAPY | Age: 53
Discharge: HOME | End: 2018-09-21
Payer: COMMERCIAL

## 2018-09-21 PROCEDURE — 97110 THERAPEUTIC EXERCISES: CPT

## 2018-09-21 NOTE — PROGRESS NOTES
PHYSICAL THERAPY - DAILY TREATMENT NOTE Patient Name: Donnie Troy        Date: 2018 : 1965   YES Patient  Verified Visit #:   15   of   25  Insurance: Payor: Angel Mckeon / Plan: [place]PO / Product Type: PPO / In time: 10:30 A Out time: 11 A Total Treatment Time: 30 Medicare Time Tracking (below) Total Timed Codes (min):  NA 1:1 Treatment Time:  NA  
 
TREATMENT AREA =  Left shoulder pain [M25.512] SUBJECTIVE Pain Level (on 0 to 10 scale):  0  / 10 Medication Changes/New allergies or changes in medical history, any new surgeries or procedures? NO    If yes, update Summary List  
Subjective Functional Status/Changes:  []  No changes reported Patient reports he has some pain in his L shoulder but he is using his arm much more than he was a few months ago. OBJECTIVE Modalities Rationale:    
 min [] Estim, type/location:    
                                 []  att     []  unatt     []  w/US     []  w/ice    []  w/heat  
 min []  Mechanical Traction: type/lbs   
               []  pro   []  sup   []  int   []  cont    []  before manual    []  after manual  
 min []  Ultrasound, settings/location:    
 min []  Iontophoresis w/ dexamethasone, location:   
                                           []  take home patch       []  in clinic  
 min []  Ice     []  Heat    location/position:   
 min []  Vasopneumatic Device, press/temp:   
 min []  Other:   
[] Skin assessment post-treatment (if applicable):   
[]  intact    []  redness- no adverse reaction    
[]redness  adverse reaction:     
 
30 min Therapeutic Exercise:  [x]  See flow sheet Rationale:      increase ROM and increase strength to improve the patients ability to return to light lifting  
 
 min Manual Therapy:   
Rationale:      
 
 min Therapeutic Activity:   
Rationale:     
 
 min Neuromuscular Re-ed:   
Rationale:   
 
 min Gait Training:   
Rationale: min Patient Education:  YES  Reviewed HEP []  Progressed/Changed HEP based on: Other Objective/Functional Measures: 
 
Increased to GTB with IR today (modified TE per flow sheet, personal time constraints) Post Treatment Pain Level (on 0 to 10) scale:   0  / 10 ASSESSMENT Assessment/Changes in Function:  
 
Good tolerance to strength progressions today  
  
[]  See Progress Note/Recertification Patient will continue to benefit from skilled PT services to modify and progress therapeutic interventions, address functional mobility deficits, address ROM deficits, address strength deficits, assess and modify postural abnormalities and instruct in home and community integration to attain remaining goals. Progress toward goals / Updated goals: 
 
Progressing towards LTG 1, 2 PLAN [x]  Upgrade activities as tolerated YES Continue plan of care  
[]  Discharge due to :   
[]  Other:   
 
Therapist: Duyen Dubon PT Date: 9/21/2018 Time: 12:10 PM  
 
Future Appointments Date Time Provider Dylon Madison 9/24/2018 12:30 PM Jairo Crocker [facility]  
9/28/2018 10:30 AM Purnima Osborn PT [facility]

## 2018-09-24 ENCOUNTER — HOSPITAL ENCOUNTER (OUTPATIENT)
Dept: PHYSICAL THERAPY | Age: 53
Discharge: HOME | End: 2018-09-24
Payer: COMMERCIAL

## 2018-09-24 PROCEDURE — 97110 THERAPEUTIC EXERCISES: CPT

## 2018-09-24 NOTE — PROGRESS NOTES
PHYSICAL THERAPY - DAILY TREATMENT NOTE Patient Name: Susie He        Date: 2018 : 1965   yes Patient  Verified Visit #:   15   of   18  Insurance: Payor: Etelvina Kiser / Plan: [place] PPO / Product Type: PPO / In time: 12:30 PM Out time: 1:25 PM  
Total Treatment Time: 54 Medicare/BCBS Time Tracking (below) Total Timed Codes (min):  NA 1:1 Treatment Time:  NA  
 
TREATMENT AREA =  Left shoulder pain [M25.512] SUBJECTIVE Pain Level (on 0 to 10 scale):  0  / 10 Medication Changes/New allergies or changes in medical history, any new surgeries or procedures?    no  If yes, update Summary List  
Subjective Functional Status/Changes:  []  No changes reported SEE D/C OBJECTIVE Modalities Rationale:     decrease pain to improve patient's ability to return to pain-free functional ADLs 
 min [] Estim, type/location:    
                                 []  att     []  unatt     []  w/US     []  w/ice    []  w/heat  
 min []  Mechanical Traction: type/lbs   
               []  pro   []  sup   []  int   []  cont    []  before manual    []  after manual  
 min []  Ultrasound, settings/location:    
 min []  Iontophoresis w/ dexamethasone, location:   
                                           []  take home patch       []  in clinic  
10 min [x]  Ice     []  Heat    location/position: Supine with wedge to L shoulder post-session  
 min []  Vasopneumatic Device, press/temp:   
 min []  Other:   
[] Skin assessment post-treatment (if applicable):   
[]  intact    []  redness- no adverse reaction    
[]redness  adverse reaction:     
 
45 min Therapeutic Exercise:  [x]  See flow sheet Rationale:      increase ROM and increase strength to improve the patients ability to return to pain-free return to gym activities. min Manual Therapy:   
Rationale:       
 
 min Therapeutic Activity:   
Rationale:    
 
 min Neuromuscular Re-ed:   
Rationale:   
 
 min Gait Training: Rationale:     
 
 min Patient Education:  yes  Reviewed HEP []  Progressed/Changed HEP based on: Other Objective/Functional Measures: SEE D/C Post Treatment Pain Level (on 0 to 10) scale:  0  / 10 ASSESSMENT Assessment/Changes in Function: SEE D/C []  See Progress Note/Recertification Patient will continue to benefit from skilled PT services to modify and progress therapeutic interventions, address functional mobility deficits, address ROM deficits, address strength deficits, analyze and address soft tissue restrictions, analyze and cue movement patterns, analyze and modify body mechanics/ergonomics and assess and modify postural abnormalities to attain remaining goals. Progress toward goals / Updated goals: SEE D/C 
  
 
PLAN 
[]  Upgrade activities as tolerated no Continue plan of care [x]  Discharge due to : Completed PT program; progressing or met all PT goals  
[]  Other:   
 
Therapist: LUZ Darnell Date: 9/24/2018 Time: 1:35 PM  
 
Future Appointments Date Time Provider Dylon Madison 9/24/2018 12:30 PM Asia Lazo [facility]  
9/28/2018 10:30 AM Purnima Lombardi, PT [facility]

## 2018-09-24 NOTE — PROGRESS NOTES
[address] [facility]  [facility] [address] ,[address], [address]  Phone: [phone]  Fax: [phone] DISCHARGE SUMMARY Patient Name: Rajesh Powers : 1965 Treatment/Medical Diagnosis: Left shoulder pain [M25.512] Referral Source: Maxx Orr MD    
Date of Initial Visit: 18 Attended Visits: 13 Missed Visits: 1 SUMMARY OF TREATMENT Therapeutic exercise and home exercise program including ROM, stretching, gentle strengthening, scapular stabilization training; manual therapy, postural education, patient education, and cold pack for pain control and decrease soreness. CURRENT STATUS Mr. Case Nicole has made excellent progress towards his PT goals for his L shoulder pain. States 100% overall improvement with L shoulder symptoms and reports he has been pain free with his ADLs and return to gym/recreational activities. Notes more muscle fatigue in L shoulder and scapula during recreational activities than pain. L shoulder AROM is WFL; functional ER to spinal level of C7 and functional IR to spinal level of T12 with end range discomfort. Current L shoulder MMT: flex, scap, and abd 5/5; IR and ER 4+/5 Goal/Measure of Progress Goal Met? 1. Improve FOTO score from 71 points to > or = 74 points indicating improved tolerance with ADLs in regards to L knee. Status at last Eval: 71 Current Status: 86 yes 2. Improve overall strength of L shoulder to 5/5 so strength is available for return to light lifting activities at work/home. Status at last Eval: - Current Status: See above progressing 3. Patient to report 75% improvement in overall function in preparation for return to recreational activities with manageable sx in L shoulder. Status at last Eval: - Current Status: See above yes 4. Improve L shoulder AROM for IR to fxl level of T12 so ROM is available for dressing activities and/or overhead reaching. Status at last Eval: L4 Current Status: T12 yes RECOMMENDATIONS Discontinue therapy. Progressing towards or have reached established goals. If you have any questions/comments please contact us directly at [phone]. Thank you for allowing us to assist in the care of your patient. Therapist Signature: LUZ Starks, MPT, cert MDT Date: 5/52/22   Time: 1:37 PM

## 2018-09-28 ENCOUNTER — APPOINTMENT (OUTPATIENT)
Dept: PHYSICAL THERAPY | Age: 53
End: 2018-09-28
Payer: COMMERCIAL

## 2023-10-18 ENCOUNTER — HOSPITAL ENCOUNTER (OUTPATIENT)
Facility: HOSPITAL | Age: 58
Setting detail: RECURRING SERIES
Discharge: HOME | End: 2023-10-21
Payer: COMMERCIAL

## 2023-10-18 PROCEDURE — 97162 PT EVAL MOD COMPLEX 30 MIN: CPT

## 2023-10-18 NOTE — THERAPY EVALUATION
[address] [facility] PHYSICAL THERAPY  [address], [address], [place], [address] Ph: [phone] Fx: [phone]  Plan of Care / Statement of Necessity for Physical Therapy Services     Patient Name: Radha Erickson : 1965   Medical   Diagnosis: Pain in left knee [M25.562]; s/p left knee arthroscopy, partial medial meniscectomy Treatment Diagnosis:  M25.562  LEFT KNEE PAIN     Onset Date: DOS:  10/16/2023     Referral Source: Nani Saab MD Start of Care [facility]): 10/18/2023   Prior Hospitalization: See medical history Provider #: [id number]   Prior Level of Function: Independent work, ADLs, home chores, community mobility, sleep, and recreation/fitness activities, but left knee pain the last several months. Comorbidities: HTN     Assessment / key information:  Patient is a 66year old male referred to PT by his orthopedic doctor (10/16/23) for s/p left knee arthroscopy, partial medial meniscectomy. Today in PT, patient reports left knee bothersome for several months and progressively worsening prior to surgery; tried a few sessions of PT (direct access at [facility]) prior to seeing orthopedist and having MRI. Pt not sure of specific injury, but may stepped in a hole/rut while mowing lawn--no prior left knee injuries or problems. Bilat hips and feet/ankles and right knee all OK. Pt denies locking or buckling prior to surgery, but pain and swelling. Patient home same day post-up using bilat axillary crutches--no AD use prior to surgery. Pt has been icing and elevation left knee/LE doing a lot of ankle pumps. Pt taking Oxycodone ~2x/day for pain and is allowed Tylenol; has Rx for Celebrex, but not taking it. Pt on a blood thinner 2x/day currently; left LE currently ACE wrapped and he has anti-embolism stocking and is using mechanical calf pump devices given to him post-op. Patient denies signs/symptoms of DVT/PE or of infection.   Pt plans to return to work fall/safety awareness

## 2023-10-18 NOTE — PROGRESS NOTES
PHYSICAL / OCCUPATIONAL THERAPY - DAILY TREATMENT NOTE (updated )    Patient Name: Ubaldo Hudson    Date: 10/18/2023    : 1965  Insurance: Payor: Benton Nath / Plan: OPTIMA PPO / Product Type: *No Product type* /      Patient  verified Yes     Visit #   Current / Total 1 24   Time   In / Out 9:40 10:24   Pain   In / Out 7/10 7/10   Subjective Functional Status/Changes:   See Eval/POC. TREATMENT AREA =  Pain in left knee [M25.562]    OBJECTIVE    36 min [x]Eval - untimed                      Therapeutic Procedures: Tx Min Billable or 1:1 Min (if diff from Tx Min) Procedure, Rationale, Specifics   [phone] [id number] Therapeutic Exercise (timed):  increase ROM, strength, coordination, balance, and proprioception to improve patient's ability to progress to PLOF and address remaining functional goals. (see flow sheet as applicable)     Details if applicable:  patient briefly instructed in and performed beginning HEP. Handouts issued with pictures and written directions for HEP; copies placed in chart. Details if applicable:            Details if applicable:            Details if applicable:            Details if applicable:     8  [facility] Totals Reminder: bill using total billable min of TIMED therapeutic procedures (example: do not include dry needle or estim unattended, both untimed codes, in totals to left)  8-22 min = 1 unit; 23-37 min = 2 units; 38-52 min = 3 units; 53-67 min = 4 units; 68-82 min = 5 units   Total Total     [x]  Patient Education billed concurrently with other procedures   [x] Review HEP    [] Progressed/Changed HEP, detail:    [] Other detail:       Objective Information/Functional Measures/Assessment    See Eval/POC.     Patient will continue to benefit from skilled PT / OT services to modify and progress therapeutic interventions, analyze and address functional mobility deficits, analyze and address ROM deficits, analyze and address strength deficits, analyze and address soft

## 2023-10-20 ENCOUNTER — HOSPITAL ENCOUNTER (OUTPATIENT)
Facility: HOSPITAL | Age: 58
Setting detail: RECURRING SERIES
Discharge: HOME | End: 2023-10-23
Payer: COMMERCIAL

## 2023-10-20 PROCEDURE — 97110 THERAPEUTIC EXERCISES: CPT

## 2023-10-20 PROCEDURE — 97112 NEUROMUSCULAR REEDUCATION: CPT

## 2023-10-20 PROCEDURE — 97530 THERAPEUTIC ACTIVITIES: CPT

## 2023-10-20 NOTE — PROGRESS NOTES
PHYSICAL / OCCUPATIONAL THERAPY - DAILY TREATMENT NOTE (updated )    Patient Name: Nancy Mai    Date: 10/20/2023    : 1965  Insurance: Payor: Airam Maldonado / Plan: OPTIMA PPO / Product Type: *No Product type* /      Patient  verified Yes     Visit #   Current / Total 2 24   Time   In / Out 11:41 12:26   Pain   In / Out 3/10 3/10   Subjective Functional Status/Changes: No new complaints; HEP OK. Pt has unwrapped and re-wrapped leg--incision sites doing well. TREATMENT AREA =  Pain in left knee [M25.562]    OBJECTIVE    Modalities Rationale:     decrease edema, decrease inflammation, and decrease pain to improve patient's ability to progress to PLOF and address remaining functional goals. min [] Estim Unattended, type/location:                                      []  w/ice    []  w/heat    min [] Estim Attended, type/location:                                     []  w/US     []  w/ice    []  w/heat    []  TENS insruct      min []  Mechanical Traction: type/lbs                   []  pro   []  sup   []  int   []  cont    []  before manual    []  after manual    min []  Ultrasound, settings/location:      min  unbill []  Ice     []  Heat    location/position:     min []  Paraffin,  details:     min []  Vasopneumatic Device, press/temp:     min []  Serena Anon / Kriste [place]: If using vaso (only need to measure limb vaso being performed on)      pre-treatment girth :       post-treatment girth :       measured at (landmark location) :      min []  Other:    Skin assessment post-treatment:   Intact      Therapeutic Procedures: Tx Min Billable or 1:1 Min (if diff from Tx Min) Procedure, Rationale, Specifics   25  [id number] Therapeutic Exercise (timed):  increase ROM, strength, coordination, balance, and proprioception to improve patient's ability to progress to PLOF and address remaining functional goals.  (see flow sheet as applicable)     Details if applicable:     10  71284 Therapeutic Activity (timed):

## 2023-10-23 ENCOUNTER — HOSPITAL ENCOUNTER (OUTPATIENT)
Facility: HOSPITAL | Age: 58
Setting detail: RECURRING SERIES
Discharge: HOME | End: 2023-10-26
Payer: COMMERCIAL

## 2023-10-23 PROCEDURE — 97530 THERAPEUTIC ACTIVITIES: CPT

## 2023-10-23 PROCEDURE — 97112 NEUROMUSCULAR REEDUCATION: CPT

## 2023-10-23 PROCEDURE — 97110 THERAPEUTIC EXERCISES: CPT

## 2023-10-23 NOTE — PROGRESS NOTES
and modify for postural abnormalities, analyze and address imbalance/dizziness, and instruct in home and community integration to address functional deficits and attain remaining goals. Progress toward goals / Updated goals:  []  See Progress Note/Recertification    Short Term Goals: To be accomplished in 2-3 weeks  Patietn Independent and complaint with progressive HEP/Self-Care Routine. Status at IE:  No HEP/Initiated. Pt reports compliance without problems. Decrease max pain scale rating to </= 2-3/10. Status at IE:  up to 8/10.              4/10 today Pre-Tx and 3/10 post-Tx. .  Increase left knee extension PROM to equal to left, >/= 0 degrees, without pain. Status at IE:  nearly to 0 degrees with mild end range discomfort. Appears to be full extension. Increase left knee flexion PROM to >/= 145 degrees. Status at IE:  to 105 degrees by heel slide with discomfort near/at end range. Improved knee flexion with self-PROM. Long Term Goals: To be accomplished in 4-6 weeks  Improve FOTO score to >/= 77. Status at IE:  52              Reassess at 30 days. 2. Increase left Knee and Hip MMT scores to >/= 5-/5 and no/minimal discomfort with resistive testing. Status at IE:  Knee Flexion 4 to 4+/5 and OK; Knee Extension 4/5 and OK; Hip Flex 5/5 OK; Hip ER pain with resisted motion; Hip IR 5/5 OK; Hip Ext, aBduction, and aDduction Not Tested; Heel Raises in SLS 4- to 4/5 left, OK and cautious. Performed well with heel raises, knee flex and ext against T-band resistance at ankle/contralateral LE static hold, and heel raises. 3. Patient able to go up/down stairs reciprocally without railing use or increased pain. Status at IE:  up/down one at a time using right LE, crutches and/or railings. Not addressed this visit.   4. No gait deviations without AD use for

## 2023-10-25 ENCOUNTER — HOSPITAL ENCOUNTER (OUTPATIENT)
Facility: HOSPITAL | Age: 58
Setting detail: RECURRING SERIES
Discharge: HOME | End: 2023-10-28
Payer: COMMERCIAL

## 2023-10-25 PROCEDURE — 97110 THERAPEUTIC EXERCISES: CPT

## 2023-10-25 PROCEDURE — 97530 THERAPEUTIC ACTIVITIES: CPT

## 2023-10-25 PROCEDURE — 97112 NEUROMUSCULAR REEDUCATION: CPT

## 2023-10-25 NOTE — PROGRESS NOTES
PHYSICAL / OCCUPATIONAL THERAPY - DAILY TREATMENT NOTE (updated )    Patient Name: Dafne Cotto    Date: 10/25/2023    : 1965  Insurance: Payor: Hedy Johansen / Plan: OPTIMA PPO / Product Type: *No Product type* /      Patient  verified Yes     Visit #   Current / Total 4 24   Time   In / Out 1105  1155    Pain   In / Out 3/10  3.5/10    Subjective Functional Status/Changes: \" Pt notes that he did ok following the last tx session and that he has been walking more\". TREATMENT AREA =  Pain in left knee [M25.562]    OBJECTIVE    Modalities Rationale:     decrease edema, decrease inflammation, and decrease pain to improve patient's ability to progress to PLOF and address remaining functional goals. min [] Estim Unattended, type/location:                                                 []  w/ice    []  w/heat     min [] Estim Attended, type/location:                                                []  w/US     []  w/ice    []  w/heat    []  TENS insruct       min []  Mechanical Traction: type/lbs                    []  pro   []  sup   []  int   []  cont    []  before manual    []  after manual     min []  Ultrasound, settings/location:        10 min  unbill [x]  Ice     []  Heat    location/position:  Left knee post-Tx/supine with BLE elevated on wedge. 4 layers between skin and ice (pillowcases)     min []  Paraffin,  details:       min []  Vasopneumatic Device, press/temp:       min []  Ana [place] / Kenny Hammed: If using vaso (only need to measure limb vaso being performed on)      pre-treatment girth :       post-treatment girth :       measured at (landmark location) :       min []  Other:     Skin assessment post-treatment:   Intact      Therapeutic Procedures:     Tx Min Billable or 1:1 Min (if diff from Tx Min) Procedure, Rationale, Specifics   20  [id number] Therapeutic Exercise (timed):  increase ROM, strength, coordination, balance, and proprioception to improve patient's ability to

## 2023-10-30 ENCOUNTER — HOSPITAL ENCOUNTER (OUTPATIENT)
Facility: HOSPITAL | Age: 58
Setting detail: RECURRING SERIES
Discharge: HOME | End: 2023-11-02
Payer: COMMERCIAL

## 2023-10-30 PROCEDURE — 97110 THERAPEUTIC EXERCISES: CPT

## 2023-10-30 PROCEDURE — 97112 NEUROMUSCULAR REEDUCATION: CPT

## 2023-10-30 NOTE — PROGRESS NOTES
PHYSICAL / OCCUPATIONAL THERAPY - DAILY TREATMENT NOTE (updated )    Patient Name: Soraya Alter    Date: 10/30/2023    : 1965  Insurance: Payor: Jj Robison / Plan: OPTIMA PPO / Product Type: *No Product type* /      Patient  verified Yes     Visit #   Current / Total 5 24   Time   In / Out 1:44 2:29   Pain   In / Out 0/10 0/10   Subjective Functional Status/Changes: Pt to see MD tomorrow and likely get sutures removed. TREATMENT AREA =  Pain in left knee [M25.562]    OBJECTIVE    Modalities Rationale:     decrease edema, decrease inflammation, and decrease pain to improve patient's ability to progress to PLOF and address remaining functional goals. min [] Estim Unattended, type/location:                                                 []  w/ice    []  w/heat     min [] Estim Attended, type/location:                                                []  w/US     []  w/ice    []  w/heat    []  TENS insruct       min []  Mechanical Traction: type/lbs                    []  pro   []  sup   []  int   []  cont    []  before manual    []  after manual     min []  Ultrasound, settings/location:        0 min  unbill []  Ice     []  Heat    location/position:      min []  Paraffin,  details:       min []  Vasopneumatic Device, press/temp:       min []  Ashley Blue / [place]: If using vaso (only need to measure limb vaso being performed on)      pre-treatment girth :       post-treatment girth :       measured at (landmark location) :       min []  Other:     Skin assessment post-treatment:   Intact      Therapeutic Procedures: Tx Min Billable or 1:1 Min (if diff from Tx Min) Procedure, Rationale, Specifics   37  [id number] Therapeutic Exercise (timed):  increase ROM, strength, coordination, balance, and proprioception to improve patient's ability to progress to PLOF and address remaining functional goals.  (see flow sheet as applicable)     Details if applicable:         93070 Therapeutic

## 2023-11-01 ENCOUNTER — HOSPITAL ENCOUNTER (OUTPATIENT)
Facility: HOSPITAL | Age: 58
Setting detail: RECURRING SERIES
Discharge: HOME | End: 2023-11-04
Payer: COMMERCIAL

## 2023-11-01 PROCEDURE — 97110 THERAPEUTIC EXERCISES: CPT

## 2023-11-01 PROCEDURE — 97112 NEUROMUSCULAR REEDUCATION: CPT

## 2023-11-01 NOTE — PROGRESS NOTES
PHYSICAL / OCCUPATIONAL THERAPY - DAILY TREATMENT NOTE (updated )    Patient Name: Jagruti Mcfarland    Date: 2023    : 1965  Insurance: Payor: Dimple Serrano / Plan: [employer] PPO / Product Type: *No Product type* /      Patient  verified Yes     Visit #   Current / Total 6 24   Time   In / Out 0937  1030    Pain   In / Out 0 /10 0 /10   Subjective Functional Status/Changes: \"I got my sutures removed yesterday. My knee is swollen, the PA said to just keep an eye on it. \" Pt denied any other new concerns. Pt noted increased swelling following halloween event he attended on Friday. Cont to ice the knee and has been wearing and ACE wrap with reduced swelling noted. TREATMENT AREA =  Pain in left knee [M25.562]    OBJECTIVE    Modalities Rationale:     decrease edema, decrease inflammation, and decrease pain to improve patient's ability to progress to PLOF and address remaining functional goals. min [] Estim Unattended, type/location:                                                 []  w/ice    []  w/heat     min [] Estim Attended, type/location:                                                []  w/US     []  w/ice    []  w/heat    []  TENS insruct       min []  Mechanical Traction: type/lbs                    []  pro   []  sup   []  int   []  cont    []  before manual    []  after manual     min []  Ultrasound, settings/location:       10 min  unbill []  Ice     []  Heat    location/position: Supine, BLE elevated on wedge, 3 layers between skin and ice     min []  Paraffin,  details:       min []  Vasopneumatic Device, press/temp:       min []  Jm Quinones / Dave Watts: If using vaso (only need to measure limb vaso being performed on)      pre-treatment girth :       post-treatment girth :       measured at (landmark location) :       min []  Other:     Skin assessment post-treatment:   Intact      Therapeutic Procedures:     Tx Min Billable or 1:1 Min (if diff from [employer]) Procedure,

## 2023-11-06 ENCOUNTER — TELEPHONE (OUTPATIENT)
Facility: HOSPITAL | Age: 58
End: 2023-11-06

## 2023-11-06 NOTE — TELEPHONE ENCOUNTER
Pt NS for appt. Called and left VM re: missed visit and provided # to clinic to schedule further visits.

## 2023-11-15 ENCOUNTER — HOSPITAL ENCOUNTER (OUTPATIENT)
Facility: HOSPITAL | Age: 58
Setting detail: RECURRING SERIES
Discharge: HOME | End: 2023-11-18
Payer: COMMERCIAL

## 2023-11-15 PROCEDURE — 97112 NEUROMUSCULAR REEDUCATION: CPT

## 2023-11-15 PROCEDURE — 97110 THERAPEUTIC EXERCISES: CPT

## 2023-11-15 PROCEDURE — 97530 THERAPEUTIC ACTIVITIES: CPT

## 2023-11-15 NOTE — PROGRESS NOTES
PHYSICAL / OCCUPATIONAL THERAPY - DAILY TREATMENT NOTE (updated )    Patient Name: Sophie Alaniz    Date: 11/15/2023    : 1965  Insurance: Payor: Zuleyka Law / Plan: [employer] PPO / Product Type: *No Product type* /      Patient  verified Yes     Visit #   Current / Total 7 24   Time   In / Out 7:40 8:30   Pain   In / Out 0/10 0/10   Subjective Functional Status/Changes: No new complaints. Pt thinks that he is very near DC point, but concerns about return to golfing. TREATMENT AREA =  Pain in left knee [M25.562]    OBJECTIVE    Modalities Rationale:     decrease edema, decrease inflammation, and decrease pain to improve patient's ability to progress to PLOF and address remaining functional goals. min [] Estim Unattended, type/location:                                                 []  w/ice    []  w/heat     min [] Estim Attended, type/location:                                                []  w/US     []  w/ice    []  w/heat    []  TENS insruct       min []  Mechanical Traction: type/lbs                    []  pro   []  sup   []  int   []  cont    []  before manual    []  after manual     min []  Ultrasound, settings/location:       0 min  unbill []  Ice     []  Heat    location/position:      min []  Paraffin,  details:       min []  Vasopneumatic Device, press/temp:       min []  Summer James / Mila Sea: If using vaso (only need to measure limb vaso being performed on)      pre-treatment girth :       post-treatment girth :       measured at (landmark location) :       min []  Other:     Skin assessment post-treatment:   Intact      Therapeutic Procedures: Tx Min Billable or 1:1 Min (if diff from Tx Min) Procedure, Rationale, Specifics   80 [id number] Therapeutic Exercise (timed):  increase ROM, strength, coordination, balance, and proprioception to improve patient's ability to progress to PLOF and address remaining functional goals.  (see flow sheet as applicable)

## 2023-11-20 ENCOUNTER — HOSPITAL ENCOUNTER (OUTPATIENT)
Facility: HOSPITAL | Age: 58
Setting detail: RECURRING SERIES
Discharge: HOME | End: 2023-11-23
Payer: COMMERCIAL

## 2023-11-20 PROCEDURE — 97112 NEUROMUSCULAR REEDUCATION: CPT

## 2023-11-20 PROCEDURE — 97530 THERAPEUTIC ACTIVITIES: CPT

## 2023-11-20 PROCEDURE — 97110 THERAPEUTIC EXERCISES: CPT

## 2023-11-20 NOTE — THERAPY DISCHARGE
for allowing us to assist in the care of your patient.     Bernadette Butler, PT       11/20/2023       1:13 PM    Not Medicaid Ins, no signature required for DC

## 2023-11-20 NOTE — PROGRESS NOTES
PHYSICAL / OCCUPATIONAL THERAPY - DAILY TREATMENT NOTE (updated )    Patient Name: Shari Vera    Date: 2023    : 1965  Insurance: Payor: Cindy Monzon / Plan: [employer] PPO / Product Type: *No Product type* /      Patient  verified Yes     Visit #   Current / Total 8 24   Time   In / Out 11:01 11:46   Pain   In / Out 0/10 0/10   Subjective Functional Status/Changes: OK after last PT session. Has not tried to golf yet. TREATMENT AREA =  Pain in left knee [M25.562]    OBJECTIVE    Therapeutic Procedures: Tx Min Billable or 1:1 Min (if diff from Tx Min) Procedure, Rationale, Specifics   15  [id number] Therapeutic Exercise (timed):  increase ROM, strength, coordination, balance, and proprioception to improve patient's ability to progress to PLOF and address remaining functional goals. (see flow sheet as applicable)     Details if applicable:  Includes Reassessment. 15  [id number] Therapeutic Activity (timed):  use of dynamic activities replicating functional movements to increase ROM, strength, coordination, balance, and proprioception in order to improve patient's ability to progress to PLOF and address remaining functional goals. (see flow sheet as applicable)     Details if applicable:  Includes FOTO.   15  [id number] Neuromuscular Re-Education (timed):  improve balance, coordination, kinesthetic sense, posture, core stability and proprioception to improve patient's ability to develop conscious control of individual muscles and awareness of position of extremities in order to progress to PLOF and address remaining functional goals.  (see flow sheet as applicable)     Details if applicable:            Details if applicable:            Details if applicable:     39  [facility] Totals Reminder: bill using total billable min of TIMED therapeutic procedures (example: do not include dry needle or estim unattended, both untimed codes, in totals to left)  8-22 min = 1 unit; 23-37 min = 2 units; 38-52 min = 3 units;